# Patient Record
Sex: MALE | Race: WHITE | HISPANIC OR LATINO
[De-identification: names, ages, dates, MRNs, and addresses within clinical notes are randomized per-mention and may not be internally consistent; named-entity substitution may affect disease eponyms.]

---

## 2018-05-04 PROBLEM — Z00.00 ENCOUNTER FOR PREVENTIVE HEALTH EXAMINATION: Status: ACTIVE | Noted: 2018-05-04

## 2018-05-09 ENCOUNTER — APPOINTMENT (OUTPATIENT)
Dept: CARDIOLOGY | Facility: CLINIC | Age: 73
End: 2018-05-09

## 2021-07-30 ENCOUNTER — NON-APPOINTMENT (OUTPATIENT)
Age: 76
End: 2021-07-30

## 2021-07-30 ENCOUNTER — APPOINTMENT (OUTPATIENT)
Dept: CARDIOLOGY | Facility: CLINIC | Age: 76
End: 2021-07-30
Payer: MEDICARE

## 2021-07-30 VITALS
HEIGHT: 69 IN | BODY MASS INDEX: 27.4 KG/M2 | DIASTOLIC BLOOD PRESSURE: 60 MMHG | SYSTOLIC BLOOD PRESSURE: 138 MMHG | HEART RATE: 76 BPM | WEIGHT: 185 LBS | OXYGEN SATURATION: 97 %

## 2021-07-30 DIAGNOSIS — Z78.9 OTHER SPECIFIED HEALTH STATUS: ICD-10-CM

## 2021-07-30 DIAGNOSIS — F32.9 MAJOR DEPRESSIVE DISORDER, SINGLE EPISODE, UNSPECIFIED: ICD-10-CM

## 2021-07-30 DIAGNOSIS — N40.0 BENIGN PROSTATIC HYPERPLASIA WITHOUT LOWER URINARY TRACT SYMPMS: ICD-10-CM

## 2021-07-30 PROCEDURE — 99204 OFFICE O/P NEW MOD 45 MIN: CPT

## 2021-07-30 RX ORDER — SELEGILINE HYDROCHLORIDE 5 MG/1
5 TABLET ORAL TWICE DAILY
Refills: 0 | Status: ACTIVE | COMMUNITY
Start: 2021-07-30

## 2021-07-30 RX ORDER — FINASTERIDE 5 MG/1
5 TABLET, FILM COATED ORAL
Qty: 90 | Refills: 3 | Status: ACTIVE | COMMUNITY
Start: 2021-07-30

## 2021-07-30 RX ORDER — METFORMIN HYDROCHLORIDE 500 MG/1
500 TABLET, COATED ORAL
Qty: 180 | Refills: 3 | Status: ACTIVE | COMMUNITY
Start: 2021-07-30

## 2021-07-30 RX ORDER — CARBIDOPA AND LEVODOPA 25; 100 MG/1; MG/1
25-100 TABLET ORAL 4 TIMES DAILY
Refills: 0 | Status: ACTIVE | COMMUNITY
Start: 2021-07-30

## 2021-07-30 RX ORDER — TAMSULOSIN HYDROCHLORIDE 0.4 MG/1
0.4 CAPSULE ORAL
Qty: 10 | Refills: 3 | Status: ACTIVE | COMMUNITY
Start: 2021-07-30

## 2021-07-30 RX ORDER — ENALAPRIL MALEATE 5 MG/1
5 TABLET ORAL DAILY
Qty: 30 | Refills: 0 | Status: ACTIVE | COMMUNITY
Start: 2021-07-30

## 2021-07-30 NOTE — HISTORY OF PRESENT ILLNESS
[FreeTextEntry1] : The patient is 75-year-old male with multiple cardiac risk factors.  He does not exercise.  He has Parkinson's disease.  Complaining of shortness of breath on exertion.  Patient is preop.  He is diabetic.  He never had any cardiac screening tests.

## 2021-07-30 NOTE — ASSESSMENT
[FreeTextEntry1] : ECG was reviewed.  Patient is preop.  He has multiple cardiac risk factors.  History of diabetes.  Physically very inactive.  He has Parkinson's disease.  I recommend cardiovascular evaluation with pharmacological nuclear stress test and echocardiogram prior to surgery.  We will follow-up after cardiac testing.

## 2021-08-09 ENCOUNTER — OUTPATIENT (OUTPATIENT)
Dept: OUTPATIENT SERVICES | Facility: HOSPITAL | Age: 76
LOS: 1 days | End: 2021-08-09

## 2021-08-11 ENCOUNTER — APPOINTMENT (OUTPATIENT)
Dept: CARDIOLOGY | Facility: CLINIC | Age: 76
End: 2021-08-11

## 2021-08-12 ENCOUNTER — APPOINTMENT (OUTPATIENT)
Dept: CARDIOLOGY | Facility: CLINIC | Age: 76
End: 2021-08-12

## 2021-08-13 ENCOUNTER — APPOINTMENT (OUTPATIENT)
Dept: CARDIOLOGY | Facility: CLINIC | Age: 76
End: 2021-08-13

## 2021-10-08 ENCOUNTER — OUTPATIENT (OUTPATIENT)
Dept: OUTPATIENT SERVICES | Facility: HOSPITAL | Age: 76
LOS: 1 days | End: 2021-10-08

## 2021-10-14 ENCOUNTER — APPOINTMENT (OUTPATIENT)
Dept: CARDIOLOGY | Facility: CLINIC | Age: 76
End: 2021-10-14
Payer: MEDICARE

## 2021-10-14 PROCEDURE — 93306 TTE W/DOPPLER COMPLETE: CPT

## 2021-10-14 PROCEDURE — 78452 HT MUSCLE IMAGE SPECT MULT: CPT

## 2021-10-14 PROCEDURE — A9502: CPT

## 2021-10-14 PROCEDURE — 93015 CV STRESS TEST SUPVJ I&R: CPT

## 2021-10-18 ENCOUNTER — RESULT CHARGE (OUTPATIENT)
Age: 76
End: 2021-10-18

## 2021-10-19 ENCOUNTER — APPOINTMENT (OUTPATIENT)
Dept: DISASTER EMERGENCY | Facility: CLINIC | Age: 76
End: 2021-10-19

## 2021-10-19 ENCOUNTER — APPOINTMENT (OUTPATIENT)
Dept: CARDIOLOGY | Facility: CLINIC | Age: 76
End: 2021-10-19
Payer: MEDICARE

## 2021-10-19 VITALS
BODY MASS INDEX: 27.25 KG/M2 | OXYGEN SATURATION: 96 % | SYSTOLIC BLOOD PRESSURE: 164 MMHG | HEIGHT: 69 IN | TEMPERATURE: 97.4 F | HEART RATE: 68 BPM | WEIGHT: 184 LBS | DIASTOLIC BLOOD PRESSURE: 84 MMHG

## 2021-10-19 DIAGNOSIS — E11.9 TYPE 2 DIABETES MELLITUS W/OUT COMPLICATIONS: ICD-10-CM

## 2021-10-19 DIAGNOSIS — I10 ESSENTIAL (PRIMARY) HYPERTENSION: ICD-10-CM

## 2021-10-19 DIAGNOSIS — I77.810 THORACIC AORTIC ECTASIA: ICD-10-CM

## 2021-10-19 DIAGNOSIS — G20 PARKINSON'S DISEASE: ICD-10-CM

## 2021-10-19 DIAGNOSIS — Z01.810 ENCOUNTER FOR PREPROCEDURAL CARDIOVASCULAR EXAMINATION: ICD-10-CM

## 2021-10-19 DIAGNOSIS — E78.5 HYPERLIPIDEMIA, UNSPECIFIED: ICD-10-CM

## 2021-10-19 DIAGNOSIS — K46.9 UNSPECIFIED ABDOMINAL HERNIA W/OUT OBSTRUCTION OR GANGRENE: ICD-10-CM

## 2021-10-19 DIAGNOSIS — Z01.818 ENCOUNTER FOR OTHER PREPROCEDURAL EXAMINATION: ICD-10-CM

## 2021-10-19 PROCEDURE — 99214 OFFICE O/P EST MOD 30 MIN: CPT

## 2021-10-20 LAB — SARS-COV-2 N GENE NPH QL NAA+PROBE: NOT DETECTED

## 2021-10-22 ENCOUNTER — OUTPATIENT (OUTPATIENT)
Dept: OUTPATIENT SERVICES | Facility: HOSPITAL | Age: 76
LOS: 1 days | End: 2021-10-22

## 2021-11-18 NOTE — ADDENDUM
[FreeTextEntry1] : Patient called the office requesting addendum to prior cardiac clearance for second hernia procedure. Since last seen there are no new complaints. Medications have remained unchanged. Clinically doing well with no new exertional complaints.\par Today he denies chest pain, pressure, unusual shortness of breath, lightheadedness, dizziness, near syncope or syncope. \par \par Preoperative status for hernia repair\par At present, there are no active cardiac conditions. \par No recent unstable coronary syndromes, decompensated heart failure, severe valvular heart disease or significant dysrhythmias.  \par Baseline functional status is limited due to Parkinson's disease. Walking with a cane\par The clinical benefit of the proposed procedure outweighs the associated cardiovascular risk.  \par Risk not attenuated with further CV testing.  \par Prior testing as outlined above.\par Optimized from a cardiovascular perspective.\par DVT ppx\par \par Red flag symptoms which would warrant sooner emergent evaluation reviewed with the patient. \par Questions and concerns were addressed and answered. \par \par GUILLERMO Fabian PA-C\par Supervising MD: Dr. Ashlyn Braga

## 2021-11-18 NOTE — ASSESSMENT
[FreeTextEntry1] : MOLLY ESCALANTE  is a 75 year M  who presents today Oct 19, 2021 with the above history and the following active issues.\par \par Risk factors for CAD including abnormal EKG, age, gender, DM. Echocardiogram demonstrates normal resting cardiac structure and function. Dilated aortic root 4.4cm. Recommend yearly follow-up.  Nuclear stress test without evidence of ischemia. Limitations of non-invasive testing reviewed. Recommend fasting lipid panel to assess need for statin therapy. Likely patient will benefit from it. Lifestyle and risk factor modification. \par \par DM follow with PCP/endo\par \par Preoperative status for hernia repair\par At present, there are no active cardiac conditions. \par No recent unstable coronary syndromes, decompensated heart failure, severe valvular heart disease or significant dysrhythmias.  \par Baseline functional status is limited due to Parkinson's disease. Walking with a cane\par The clinical benefit of the proposed procedure outweighs the associated cardiovascular risk.  \par Risk not attenuated with further CV testing.  \par Prior testing as outlined above.\par Optimized from a cardiovascular perspective.\par DVT ppx\par \par Red flag symptoms which would warrant sooner emergent evaluation reviewed with the patient. \par Questions and concerns were addressed and answered. \par \par \par Sincerely,\par \par Lisandra Fabian PA-C\par Patients history, testing and plan reviewed with supervising MD: Dr. Ashlyn Braga

## 2021-11-18 NOTE — HISTORY OF PRESENT ILLNESS
[FreeTextEntry1] : MOLLY ESCALANTE  is a 75 year M  who presents today Oct 19, 2021 for preoperative clearance upcoming hernia surgery. Past history of Parkinson's disease and DM. At last visit nuclear stress test recommended. \par Today he offers no new complaints. Asymptomatic from a cardiovascular standpoint. No recent illness or hospital stay. He is here today with his granddaughter who is translating. \par Blood pressure initially elevated and on reassessment well controlled. \par \par Today he denies chest pain, pressure, unusual shortness of breath, lightheadedness, dizziness, near syncope or syncope. \par \par There is no prior history of myocardial infarction, coronary revascularization, history of ischemic heart disease, or symptomatic congestive heart failure. There is no history of symptomatic arrhythmias including atrial fibrillation. \par \par \par \par TESTING:\par \par Nuclear stress test 10/2021 small defect in mid inferior wall that is fixed suggestive of scar\par \par Echo 10/14/21 EF 56-60%, mild MR, aortic root 4.4cm(dilated) asc aorta 3.9cm\par \par EKG 10/2021 SR with LAE, poss old SWMI, IVCD and non-specific ST-T wave changes\par \par Labs 8/2021 Sodium 139, potassium 4.2, BUN 22, Creat 0.9, HgbA1c 5.5

## 2021-11-19 ENCOUNTER — NON-APPOINTMENT (OUTPATIENT)
Age: 76
End: 2021-11-19

## 2021-11-23 ENCOUNTER — OUTPATIENT (OUTPATIENT)
Dept: OUTPATIENT SERVICES | Facility: HOSPITAL | Age: 76
LOS: 1 days | End: 2021-11-23

## 2021-12-06 ENCOUNTER — OUTPATIENT (OUTPATIENT)
Dept: OUTPATIENT SERVICES | Facility: HOSPITAL | Age: 76
LOS: 1 days | End: 2021-12-06

## 2022-10-28 ENCOUNTER — APPOINTMENT (OUTPATIENT)
Dept: CARDIOLOGY | Facility: CLINIC | Age: 77
End: 2022-10-28

## 2023-07-19 ENCOUNTER — OFFICE (OUTPATIENT)
Dept: URBAN - METROPOLITAN AREA CLINIC 8 | Facility: CLINIC | Age: 78
Setting detail: OPHTHALMOLOGY
End: 2023-07-19
Payer: MEDICARE

## 2023-07-19 DIAGNOSIS — H40.013: ICD-10-CM

## 2023-07-19 DIAGNOSIS — H11.042: ICD-10-CM

## 2023-07-19 DIAGNOSIS — H11.151: ICD-10-CM

## 2023-07-19 PROBLEM — H16.223 DRY EYE SYNDROME K SICCA; BOTH EYES: Status: ACTIVE | Noted: 2023-07-19

## 2023-07-19 PROCEDURE — 92083 EXTENDED VISUAL FIELD XM: CPT | Performed by: OPHTHALMOLOGY

## 2023-07-19 PROCEDURE — 92014 COMPRE OPH EXAM EST PT 1/>: CPT | Performed by: OPHTHALMOLOGY

## 2023-07-19 PROCEDURE — 92133 CPTRZD OPH DX IMG PST SGM ON: CPT | Performed by: OPHTHALMOLOGY

## 2023-07-19 ASSESSMENT — SPHEQUIV_DERIVED
OD_SPHEQUIV: 0.75
OS_SPHEQUIV: 0
OS_SPHEQUIV: 0.5

## 2023-07-19 ASSESSMENT — REFRACTION_AUTOREFRACTION
OS_AXIS: UNABLE
OD_CYLINDER: -3.00
OD_AXIS: 089
OS_SPHERE: UNABLE
OS_CYLINDER: UNABLE
OD_SPHERE: +2.25

## 2023-07-19 ASSESSMENT — REFRACTION_MANIFEST
OD_ADD: +2.75
OS_CYLINDER: -2.00
OS_SPHERE: +1.50
OS_VA1: 20/50+
OD_VA1: 20/25
OD_CYLINDER: -2.00
OS_AXIS: 165
OS_AXIS: 165
OD_SPHERE: +1.75
OD_AXIS: 085
OD_VA2: 20/20(J1+)
OS_CYLINDER: -0.50
OD_AXIS: 090
OD_ADD: +2.75
OD_SPHERE: +1.75
OS_VA2: 20/20(J1+)
OD_VA2: 20/50
OS_ADD: +2.75
OS_SPHERE: +0.25
OD_CYLINDER: -2.00
OS_ADD: +2.75
OS_VA2: 20/50

## 2023-07-19 ASSESSMENT — KERATOMETRY
METHOD_AUTO_MANUAL: AUTO
OD_AXISANGLE_DEGREES: 001
OD_K1POWER_DIOPTERS: 40.75
OS_AXISANGLE_DEGREES: 061
OD_K2POWER_DIOPTERS: 43.25
OS_K1POWER_DIOPTERS: 41.25
OS_K2POWER_DIOPTERS: 42.25

## 2023-07-19 ASSESSMENT — AXIALLENGTH_DERIVED
OS_AL: 24.2527
OD_AL: 23.8535
OD_AL: 23.8535
OS_AL: 24.0486
OD_AL: 23.8535

## 2023-07-19 ASSESSMENT — REFRACTION_CURRENTRX
OS_SPHERE: +1.50
OD_ADD: +2.75
OS_VPRISM_DIRECTION: BF
OD_OVR_VA: 20/
OD_VPRISM_DIRECTION: BF
OD_CYLINDER: -2.25
OS_OVR_VA: 20/
OS_ADD: +2.75
OD_SPHERE: +2.00
OS_CYLINDER: -2.00
OS_AXIS: 160
OD_AXIS: 082

## 2023-07-19 ASSESSMENT — CONFRONTATIONAL VISUAL FIELD TEST (CVF)
OS_FINDINGS: FULL
OD_FINDINGS: FULL

## 2023-07-19 ASSESSMENT — LID POSITION - DERMATOCHALASIS
OS_DERMATOCHALASIS: 1+
OD_DERMATOCHALASIS: 1+

## 2023-07-19 ASSESSMENT — TONOMETRY
OD_IOP_MMHG: 13
OS_IOP_MMHG: 14

## 2023-07-19 ASSESSMENT — VISUAL ACUITY
OD_BCVA: 20/30-2
OS_BCVA: 20/30-2

## 2023-07-19 ASSESSMENT — CORNEAL PTERYGIUM: OS_PTERYGIUM: ENCROACHING PUPIL NASAL 4MM 3MM

## 2023-07-19 ASSESSMENT — SUPERFICIAL PUNCTATE KERATITIS (SPK)
OS_SPK: T
OD_SPK: 1+

## 2023-11-30 ENCOUNTER — OFFICE (OUTPATIENT)
Dept: URBAN - METROPOLITAN AREA CLINIC 8 | Facility: CLINIC | Age: 78
Setting detail: OPHTHALMOLOGY
End: 2023-11-30
Payer: MEDICARE

## 2023-11-30 DIAGNOSIS — H02.834: ICD-10-CM

## 2023-11-30 DIAGNOSIS — H00.14: ICD-10-CM

## 2023-11-30 DIAGNOSIS — H11.151: ICD-10-CM

## 2023-11-30 PROCEDURE — 99214 OFFICE O/P EST MOD 30 MIN: CPT | Performed by: OPHTHALMOLOGY

## 2023-11-30 ASSESSMENT — SUPERFICIAL PUNCTATE KERATITIS (SPK)
OS_SPK: T
OD_SPK: 1+

## 2023-11-30 ASSESSMENT — CONFRONTATIONAL VISUAL FIELD TEST (CVF)
OD_FINDINGS: FULL
OS_FINDINGS: FULL

## 2023-11-30 ASSESSMENT — LID POSITION - DERMATOCHALASIS
OD_DERMATOCHALASIS: 1+
OS_DERMATOCHALASIS: 1+

## 2023-11-30 ASSESSMENT — CORNEAL PTERYGIUM: OS_PTERYGIUM: ENCROACHING PUPIL NASAL 4MM 3MM

## 2023-12-01 PROBLEM — H02.834 DERMATOCHALASIS; LEFT UPPER LID: Status: ACTIVE | Noted: 2023-11-30

## 2023-12-01 PROBLEM — H00.14 CHALAZION; LEFT UPPER LID: Status: ACTIVE | Noted: 2023-11-30

## 2023-12-01 ASSESSMENT — REFRACTION_CURRENTRX
OS_CYLINDER: -2.00
OS_SPHERE: +1.50
OD_ADD: +2.75
OS_AXIS: 160
OD_AXIS: 082
OD_OVR_VA: 20/
OD_SPHERE: +2.00
OD_CYLINDER: -2.25
OD_VPRISM_DIRECTION: BF
OS_VPRISM_DIRECTION: BF
OS_OVR_VA: 20/
OS_ADD: +2.75

## 2023-12-01 ASSESSMENT — KERATOMETRY
OD_AXISANGLE_DEGREES: 001
OD_K2POWER_DIOPTERS: 43.25
OS_K1POWER_DIOPTERS: 41.25
OD_K1POWER_DIOPTERS: 40.75
OS_K2POWER_DIOPTERS: 42.25
METHOD_AUTO_MANUAL: AUTO
OS_AXISANGLE_DEGREES: 061

## 2023-12-01 ASSESSMENT — REFRACTION_MANIFEST
OD_CYLINDER: -2.00
OS_SPHERE: +1.50
OS_CYLINDER: -2.00
OD_SPHERE: +1.75
OS_SPHERE: +0.25
OS_AXIS: 165
OS_VA2: 20/50
OD_ADD: +2.75
OS_ADD: +2.75
OS_VA2: 20/20(J1+)
OS_ADD: +2.75
OD_CYLINDER: -2.00
OD_ADD: +2.75
OD_VA1: 20/25
OS_CYLINDER: -0.50
OS_VA1: 20/50+
OD_AXIS: 090
OD_VA2: 20/50
OD_AXIS: 085
OD_VA2: 20/20(J1+)
OS_AXIS: 165
OD_SPHERE: +1.75

## 2023-12-01 ASSESSMENT — REFRACTION_AUTOREFRACTION
OD_AXIS: 089
OD_CYLINDER: -3.00
OD_SPHERE: +2.25
OS_AXIS: UNABLE
OS_CYLINDER: UNABLE
OS_SPHERE: UNABLE

## 2023-12-01 ASSESSMENT — SPHEQUIV_DERIVED
OS_SPHEQUIV: 0
OD_SPHEQUIV: 0.75
OD_SPHEQUIV: 0.75
OS_SPHEQUIV: 0.5
OD_SPHEQUIV: 0.75

## 2023-12-01 ASSESSMENT — AXIALLENGTH_DERIVED
OD_AL: 23.8535
OS_AL: 24.2527
OD_AL: 23.8535
OS_AL: 24.0486
OD_AL: 23.8535

## 2024-04-25 ENCOUNTER — OFFICE (OUTPATIENT)
Dept: URBAN - METROPOLITAN AREA CLINIC 8 | Facility: CLINIC | Age: 79
Setting detail: OPHTHALMOLOGY
End: 2024-04-25
Payer: MEDICARE

## 2024-04-25 DIAGNOSIS — H11.042: ICD-10-CM

## 2024-04-25 DIAGNOSIS — H40.013: ICD-10-CM

## 2024-04-25 PROBLEM — H02.83 DERMATOCHALASIS; LEFT UPPER LID: Status: ACTIVE | Noted: 2024-04-25

## 2024-04-25 PROCEDURE — 99213 OFFICE O/P EST LOW 20 MIN: CPT | Performed by: OPHTHALMOLOGY

## 2024-04-25 PROCEDURE — 76514 ECHO EXAM OF EYE THICKNESS: CPT | Performed by: OPHTHALMOLOGY

## 2024-04-25 ASSESSMENT — LID POSITION - DERMATOCHALASIS
OD_DERMATOCHALASIS: 1+
OS_DERMATOCHALASIS: 1+

## 2024-07-25 ENCOUNTER — OFFICE (OUTPATIENT)
Dept: URBAN - METROPOLITAN AREA CLINIC 8 | Facility: CLINIC | Age: 79
Setting detail: OPHTHALMOLOGY
End: 2024-07-25
Payer: MEDICARE

## 2024-07-25 DIAGNOSIS — H16.223: ICD-10-CM

## 2024-07-25 DIAGNOSIS — H11.042: ICD-10-CM

## 2024-07-25 DIAGNOSIS — H40.013: ICD-10-CM

## 2024-07-25 PROCEDURE — 99213 OFFICE O/P EST LOW 20 MIN: CPT | Performed by: OPHTHALMOLOGY

## 2024-07-25 ASSESSMENT — CONFRONTATIONAL VISUAL FIELD TEST (CVF)
OS_FINDINGS: FULL
OD_FINDINGS: FULL

## 2024-07-25 ASSESSMENT — LID POSITION - DERMATOCHALASIS
OD_DERMATOCHALASIS: 1+
OS_DERMATOCHALASIS: 1+

## 2025-09-18 ENCOUNTER — APPOINTMENT (OUTPATIENT)
Dept: OPHTHALMOLOGY | Facility: CLINIC | Age: 80
End: 2025-09-18
Payer: MEDICARE

## 2025-09-18 ENCOUNTER — NON-APPOINTMENT (OUTPATIENT)
Age: 80
End: 2025-09-18

## 2025-09-18 PROCEDURE — 92004 COMPRE OPH EXAM NEW PT 1/>: CPT

## 2025-09-18 PROCEDURE — 92134 CPTRZ OPH DX IMG PST SGM RTA: CPT
